# Patient Record
Sex: FEMALE | Race: BLACK OR AFRICAN AMERICAN | URBAN - METROPOLITAN AREA
[De-identification: names, ages, dates, MRNs, and addresses within clinical notes are randomized per-mention and may not be internally consistent; named-entity substitution may affect disease eponyms.]

---

## 2017-01-18 ENCOUNTER — GENERIC CONVERSION - ENCOUNTER (OUTPATIENT)
Dept: OTHER | Facility: OTHER | Age: 47
End: 2017-01-18

## 2017-03-28 ENCOUNTER — APPOINTMENT (OUTPATIENT)
Dept: LAB | Facility: CLINIC | Age: 47
End: 2017-03-28
Payer: COMMERCIAL

## 2017-03-28 ENCOUNTER — TRANSCRIBE ORDERS (OUTPATIENT)
Dept: LAB | Facility: CLINIC | Age: 47
End: 2017-03-28

## 2017-03-28 DIAGNOSIS — N18.30 CHRONIC KIDNEY DISEASE, STAGE III (MODERATE) (HCC): Primary | ICD-10-CM

## 2017-03-28 DIAGNOSIS — I12.9 PARENCHYMAL RENAL HYPERTENSION, STAGE 1-4 OR UNSPECIFIED CHRONIC KIDNEY DISEASE: ICD-10-CM

## 2017-03-28 DIAGNOSIS — E11.21 DIABETIC GLOMERULOPATHY (HCC): ICD-10-CM

## 2017-03-28 DIAGNOSIS — I50.9 HEART FAILURE, UNSPECIFIED (HCC): ICD-10-CM

## 2017-03-28 DIAGNOSIS — D64.9 ANEMIA, UNSPECIFIED: ICD-10-CM

## 2017-03-28 DIAGNOSIS — E66.9 OBESITY, UNSPECIFIED: ICD-10-CM

## 2017-03-28 DIAGNOSIS — I51.7 CARDIOMEGALY: ICD-10-CM

## 2017-03-28 DIAGNOSIS — E87.1 HYPOSMOLALITY SYNDROME: ICD-10-CM

## 2017-03-28 DIAGNOSIS — IMO0002 UNCONTROLLED TYPE 2 DIABETES MELLITUS WITH OTHER SPECIFIED COMPLICATION: ICD-10-CM

## 2017-03-28 DIAGNOSIS — R80.9 PROTEINURIA: ICD-10-CM

## 2017-03-28 DIAGNOSIS — E13.40 DIABETIC NEUROPATHY ASSOCIATED WITH OTHER SPECIFIED DIABETES MELLITUS: ICD-10-CM

## 2017-03-28 DIAGNOSIS — E78.5 HYPERLIPIDEMIA, UNSPECIFIED HYPERLIPIDEMIA TYPE: ICD-10-CM

## 2017-03-28 DIAGNOSIS — N18.30 CHRONIC KIDNEY DISEASE, STAGE III (MODERATE) (HCC): ICD-10-CM

## 2017-03-28 LAB
ALBUMIN SERPL BCP-MCNC: 1.9 G/DL (ref 3.5–5)
ANION GAP SERPL CALCULATED.3IONS-SCNC: 8 MMOL/L (ref 4–13)
BASOPHILS # BLD AUTO: 0.03 THOUSANDS/ΜL (ref 0–0.1)
BASOPHILS NFR BLD AUTO: 0 % (ref 0–1)
BUN SERPL-MCNC: 80 MG/DL (ref 5–25)
CALCIUM SERPL-MCNC: 8.6 MG/DL (ref 8.3–10.1)
CHLORIDE SERPL-SCNC: 100 MMOL/L (ref 100–108)
CO2 SERPL-SCNC: 28 MMOL/L (ref 21–32)
CREAT SERPL-MCNC: 4.33 MG/DL (ref 0.6–1.3)
EOSINOPHIL # BLD AUTO: 0.17 THOUSAND/ΜL (ref 0–0.61)
EOSINOPHIL NFR BLD AUTO: 2 % (ref 0–6)
ERYTHROCYTE [DISTWIDTH] IN BLOOD BY AUTOMATED COUNT: 13.7 % (ref 11.6–15.1)
GFR SERPL CREATININE-BSD FRML MDRD: 13.4 ML/MIN/1.73SQ M
GLUCOSE SERPL-MCNC: 170 MG/DL (ref 65–140)
HCT VFR BLD AUTO: 25.9 % (ref 34.8–46.1)
HGB BLD-MCNC: 8.4 G/DL (ref 11.5–15.4)
LYMPHOCYTES # BLD AUTO: 2.7 THOUSANDS/ΜL (ref 0.6–4.47)
LYMPHOCYTES NFR BLD AUTO: 29 % (ref 14–44)
MCH RBC QN AUTO: 28.8 PG (ref 26.8–34.3)
MCHC RBC AUTO-ENTMCNC: 32.4 G/DL (ref 31.4–37.4)
MCV RBC AUTO: 89 FL (ref 82–98)
MONOCYTES # BLD AUTO: 0.56 THOUSAND/ΜL (ref 0.17–1.22)
MONOCYTES NFR BLD AUTO: 6 % (ref 4–12)
NEUTROPHILS # BLD AUTO: 5.91 THOUSANDS/ΜL (ref 1.85–7.62)
NEUTS SEG NFR BLD AUTO: 63 % (ref 43–75)
NRBC BLD AUTO-RTO: 0 /100 WBCS
PHOSPHATE SERPL-MCNC: 3.9 MG/DL (ref 2.7–4.5)
PLATELET # BLD AUTO: 408 THOUSANDS/UL (ref 149–390)
PMV BLD AUTO: 11.2 FL (ref 8.9–12.7)
POTASSIUM SERPL-SCNC: 3.8 MMOL/L (ref 3.5–5.3)
RBC # BLD AUTO: 2.92 MILLION/UL (ref 3.81–5.12)
SODIUM SERPL-SCNC: 136 MMOL/L (ref 136–145)
WBC # BLD AUTO: 9.46 THOUSAND/UL (ref 4.31–10.16)

## 2017-03-28 PROCEDURE — 85025 COMPLETE CBC W/AUTO DIFF WBC: CPT

## 2017-03-28 PROCEDURE — 80069 RENAL FUNCTION PANEL: CPT

## 2017-03-28 PROCEDURE — 36415 COLL VENOUS BLD VENIPUNCTURE: CPT

## 2017-03-29 ENCOUNTER — GENERIC CONVERSION - ENCOUNTER (OUTPATIENT)
Dept: OTHER | Facility: OTHER | Age: 47
End: 2017-03-29

## 2017-05-17 ENCOUNTER — ALLSCRIPTS OFFICE VISIT (OUTPATIENT)
Dept: OTHER | Facility: OTHER | Age: 47
End: 2017-05-17

## 2017-05-17 DIAGNOSIS — Z12.31 ENCOUNTER FOR SCREENING MAMMOGRAM FOR MALIGNANT NEOPLASM OF BREAST: ICD-10-CM

## 2017-06-06 ENCOUNTER — GENERIC CONVERSION - ENCOUNTER (OUTPATIENT)
Dept: OTHER | Facility: OTHER | Age: 47
End: 2017-06-06

## 2017-06-08 ENCOUNTER — ALLSCRIPTS OFFICE VISIT (OUTPATIENT)
Dept: OTHER | Facility: OTHER | Age: 47
End: 2017-06-08

## 2017-06-08 ENCOUNTER — GENERIC CONVERSION - ENCOUNTER (OUTPATIENT)
Dept: OTHER | Facility: OTHER | Age: 47
End: 2017-06-08

## 2017-06-09 ENCOUNTER — GENERIC CONVERSION - ENCOUNTER (OUTPATIENT)
Dept: OTHER | Facility: OTHER | Age: 47
End: 2017-06-09

## 2017-06-10 ENCOUNTER — GENERIC CONVERSION - ENCOUNTER (OUTPATIENT)
Dept: OTHER | Facility: OTHER | Age: 47
End: 2017-06-10

## 2017-06-15 ENCOUNTER — GENERIC CONVERSION - ENCOUNTER (OUTPATIENT)
Dept: OTHER | Facility: OTHER | Age: 47
End: 2017-06-15

## 2017-06-22 ENCOUNTER — ALLSCRIPTS OFFICE VISIT (OUTPATIENT)
Dept: OTHER | Facility: OTHER | Age: 47
End: 2017-06-22

## 2017-06-27 ENCOUNTER — GENERIC CONVERSION - ENCOUNTER (OUTPATIENT)
Dept: OTHER | Facility: OTHER | Age: 47
End: 2017-06-27

## 2018-01-11 NOTE — MISCELLANEOUS
Provider Comments  Provider Comments:   Patient is a no-show for her 1:00 appointment today        Signatures   Electronically signed by : ANASTACIA Molina; Nov  3 2016  1:21PM EST                       (Author)

## 2018-01-13 NOTE — MISCELLANEOUS
Assessment    1  Hypertensive crisis (401 9) (I16 9)   2  Chronic kidney disease, stage 3 (585 3) (N18 3)   3  Ischemic heart disease (414 9) (I25 9)   4  Hypertension (401 9) (I10)   5  Hyperphosphatemia (275 3) (E83 39)   6  Obstructive sleep apnea (327 23) (G47 33)   7  Morbid obesity (278 01) (E66 01)   8  Anemia of chronic disease (285 29) (D63 8)   9  Uncontrolled secondary diabetes with peripheral neuropathy (249 61,357 2)   (E13 42,E13 65)   10  Congestive heart failure (428 0) (I50 9)    Plan  Anemia of chronic disease    · Ferrous Sulfate 325 (65 Fe) MG Oral Tablet; take 1 tablet by mouth daily   Rx By: Leslie Hatchet; Dispense: 90 Days ; #:1 X 90 Tablet Bottle; Refill: 1; For: Anemia of chronic disease; CARMELA = N; Record   · Vitamin C 500 MG Oral Tablet; TAKE 1 TABLET DAILY   Rx By: Leslie Hatchet; Dispense: 90 Days ; #:90 Tablet; Refill: 1; For: Anemia of chronic disease; CARMELA = N; Verified Transmission to [address] [place] [address],  [address]; Last Updated By: System, [employer]; 5/17/2017 10:23:00 AM  Chronic kidney disease, stage 3    · 2 - Jake MENDEZ, [facility]  (Nephrology) Co-Management  *  Status: Active  Requested  for: 83XQY7110   Ordered; For: Chronic kidney disease, stage 3; Ordered By: Leslie Hatchet Performed:  Due: 92QSH5011; Last Updated By: Mary Britton; 5/17/2017 10:11:30 AM  Care Summary provided  : Yes  Congestive heart failure    · Bumetanide 1 MG Oral Tablet; Take 2 tablets orally twice daily   Rx By: Leslie Hatchet; Dispense: 30 Days ; #:120 Tablet; Refill: 1; For: Congestive heart failure; CARMELA = N; Verified Transmission to [address] [place] [address],  [address]; Last Updated By: System, SureScript[employer]; 5/17/2017 10:23:00 AM  Diabetic eye exam    · *VB - Eye Exam; Status:Active; Requested for:04Lns5504;    Perform:Other; IWV:37DXM6212;BMURUIS; For:Diabetic eye exam; Ordered By:Kadie Foster; Encounter for diabetic foot exam    · *VB - Foot Exam; Status:Active; Requested for:40Bzl8778;    Perform: In Office; HPI     Last [facility] exam 02/02/2016  Macular degeneration (senile) of retina  Pseudophakia, OU  Screening for glaucoma  RE  Decreased near point       Last edited by Niranjan Bro MA on 2/6/2017 10:14 AM.         Assessment /Plan     For exam results, see Encounter Report.    Macular degeneration (senile) of retina    Pseudophakia of both eyes    Glaucoma screening    Refractive error      ARMD mild OU = continue OCUVITE.  Stable PIOL OU.  OH OK OU otherwise.  Spec Rx given.  RTC one year.                  OPH:63NWC3904;GENO Mancera for diabetic foot exam; Ordered By:Adeola Foster; Encounter for screening mammogram for breast cancer    · * MAMMO SCREENING BILATERAL W CAD; Status:Active; Requested for:93Kjb8774;    Perform:[facility]; ILB:47NRA5734; Last Updated Jacky Mccrary; 5/17/2017 10:12:15 AM;Ordered;  For:Encounter for screening mammogram for breast cancer; Ordered By:Adeola Foster; Hyperlipidemia    · Ezetimibe 10 MG Oral Tablet (Zetia); TAKE 1 TABLET AT BEDTIME   Rx By: Rachel Ortiz; Dispense: 90 Days ; #:90 Tablet; Refill: 1; For: Hyperlipidemia; CARMELA = N; Record  Hypertension    · CloNIDine HCl - 0 2 MG Oral Tablet; TAKE 2 TABLETS EVERY 8 HOURS   Rx By: Rachel Ortiz; Dispense: 30 Days ; #:180 Tablet; Refill: 2; For: Hypertension; CARMELA = N; Record   · Losartan Potassium 50 MG Oral Tablet; TAKE 1 TABLET DAILY AS DIRECTED   Rx By: Rachel Ortiz; Dispense: 90 Days ; #:90 Tablet; Refill: 1; For: Hypertension; CARMELA = N; Record   · NIFEdipine ER Osmotic Release 90 MG Oral Tablet Extended Release 24 Hour;  take one tablet by mouth daily   Rx By: Rachel Ortiz; Dispense: 90 Days ; #:90 Tablet Extended Release 24 Hour; Refill: 1; For: Hypertension; CARMELA = N; Record  Hypertensive crisis, Ischemic heart disease    · 3  Anatoliy PATEL [facility] (CARDIOLOGY ) Co-Management  *  Status: Active  Requested  for: 66INV8776   Ordered; For: Hypertensive crisis, Ischemic heart disease; Ordered By: Rachel Ortiz Performed:  Due: 42YSH2041; Last Updated By: Forest Sanderson; 5/17/2017 10:11:41 AM  are Referring to a non- Preferred Provider : Established Patient  Care Summary provided  : Yes  Pap smear for cervical cancer screening    · Britt Emerson MD, [facility]  (Internal Medicine) Co-Management  *  Status: Active  Requested  for: 65FBI7854   Ordered;  For: Pap smear for cervical cancer screening; Ordered By: Rachel Ortiz Performed:  Due: 15JDU8684; Last Updated By: Forest Sanderson; 5/17/2017 10:12:04 AM  are Referring to a non- Preferred Provider : Patient refused suggestion for      recommended provider  Care Summary provided  : Yes  Uncontrolled secondary diabetes with peripheral neuropathy    · From  Lantus 100 UNIT/ML Subcutaneous Solution 45 units at bedtime To  Lantus 100 UNIT/ML Subcutaneous Solution 15 units at bedtime   Rx By: Cynthia St; Dispense: 0 Days ; #:1 ML; Refill: 0; For: Uncontrolled secondary diabetes with peripheral neuropathy; CARMELA = N; Record   · 3 - Abhishek MENDEZ, Dom[facility] Head  (Endocrinology) Co-Management  *  Status: Active  Requested for:  53HBV5642   Ordered; For: Uncontrolled secondary diabetes with peripheral neuropathy; Ordered By: Cynthia St Performed:  Due: 52QFR2884; Last Updated By: Tobias Morrison; 5/17/2017 10:11:52 AM  are Referring to a non- Preferred Provider : Services not provided in network  Care Summary provided  : Yes  Vitamin D deficiency    · Vitamin D (Ergocalciferol) 47594 UNIT Oral Capsule; TAKE 1 CAPSULE WEEKLY   Rx By: Cynthia St; Dispense: 30 Days ; #:4 Capsule; Refill: 5; For: Vitamin D deficiency; CARMELA = N; Record    Discussion/Summary  Discussion Summary:   Patient states that she does not want to go back to Dr Maria T Lanza for her diabetes since she's currently practicing in [place] [address] which is quite a distance for her  She prefers a provider in [facility]  I did give her the phone number for Dr Eldridge Grandchild group  She has been advised to start checking her blood sugars at home  Continue on Lantus 15 units at bedtime  Continue current blood pressure medications including labetalol, nifedipine, losartan as directed  She has been advised to sign a release form to get records from Dr Hector Wolf, cardiology  Also recommend sign a release form to get most recent blood work done at [facility] during her admission  Follow-up with nephrology as scheduled on May 31      5/18/17--Received labs from [facility]  LDL is 154  Vitamin D level is 12 8   There was no hemoglobin A1c which will be done at her next visit  Counseling Documentation With Imm: The patient was counseled regarding instructions for management, impressions  Medication SE Review and Pt Understands Tx: The treatment plan was reviewed with the patient/guardian  The patient/guardian understands and agrees with the treatment plan   Self Referrals:   Self Referrals: No      Chief Complaint  Chief Complaint Free Text Note Form: Pt here today to f/u from [facility] admitted on 4/30/17 and discharged on 5/10/17  Pt states she was admitted for HTN and severe swelling all through her body  and CHF      History of Present Illness  TCM Communication [facility]: The patient is being contacted for follow-up after hospitalization and APPT 5/17/17 WITH MOI Tatum  She was hospitalized at [facility]  The date of discharge: 5/10/17, HTN,CHRONIC KIDNEY FAILURE  She was discharged to home  Communication performed and completed by Janell Hopper   HPI: Patient presents today for follow-up from a recent hospital admission at [facility] from April 30 through May 10  She was admitted for hypertensive crisis and chronic kidney disease  She did see cardiology yesterday, Dr Laura Irby, with [facility]  She is scheduled to see nephrology on May 31  Denies any chest pain or shortness of breath at this time  Only very mild lower extremity edema  Not all of her medications were sent to the pharmacy  She does need bumetanide 1mg 2 tablets twice daily  She also needs vitamin C one tablet daily  She has been compliant with her blood pressure medications  She's also been compliant with her C Pap machine nightly  She does take her iron supplement daily  She's also on labetalol 100 mg 2 tablets once daily, losartan 50 mg 2 tablets once daily, nifedipine 90 mg one tablet daily  Clonidine 0 1 mg 2 tablets every 8 hours  Calcium acetate 667 mg one capsule 3 times daily  Atorvastatin 80 mg daily  Zetia 10 mg daily      She does not check her blood sugars regularly  She is currently on Lantus 15 units at bedtime  She states that her daughter put her glucometer away and she's not sure where it is  She has not seen endocrinology since last year  Review of Systems  Complete-Female:   Constitutional: No fever, no chills, feels well, no tiredness, no recent weight gain or weight loss  Cardiovascular: as noted in HPI  Respiratory: as noted in HPI  Active Problems    1  Abnormal electrocardiogram (794 31) (R94 31)   2  Carpal tunnel syndrome, bilateral (354 0) (G56 03)   3  Cervical cancer screening (V76 2) (Z12 4)   4  Chest pain (786 50) (R07 9)   5  Chronic kidney disease, stage 3 (585 3) (N18 3)   6  Congestive heart failure (428 0) (I50 9)   7  Edema of both legs (782 3) (R60 0)   8  Encounter for screening mammogram for breast cancer (V76 12) (Z12 31)   9  Fatigue (780 79) (R53 83)   10  Hyperlipidemia (272 4) (E78 5)   11  Hypertension (401 9) (I10)   12  Influenza vaccine needed (V04 81) (Z23)   13  Left shoulder pain (719 41) (M25 512)   14  Nephrotic range proteinuria (791 0) (R80 9)   15  Noncompliance of patient with other medical treatment and regimen (V15 81) (Z91 19)   16  Obstructive sleep apnea (327 23) (G47 33)   17  Pap smear for cervical cancer screening (V76 2) (Z12 4)   18  Restless legs syndrome (333 94) (G25 81)   19  Shortness of breath (786 05) (R06 02)   20  Type 2 diabetes mellitus with hyperglycemia (250 00) (E11 65)   21  Uncontrolled secondary diabetes with peripheral neuropathy (249 61,357 2)    (E13 42,E13 65)   22  Upper back pain (724 5) (M54 9)   23  Wound of right lower extremity (891 0) (S81 801A)    Surgical History    1  History of  Section    Family History  Mother    1  Family history of Breast Cancer (V16 3)  Sister    2  Family history of Diabetes Mellitus (V18 0)   3   Family history of Diabetes Mellitus (V18 0)    Social History    · Being A Social Drinker   · Denied: History of Drug Use   · Never A Smoker    Current Meds   1  Aspirin 81 MG TABS; Therapy: (Recorded:81Khj0096) to Recorded   2  Atorvastatin Calcium 80 MG Oral Tablet; Therapy: 52PCU6341 to Recorded   3  BD Pen Needle Shanice U/F 32G X 4 MM Miscellaneous; Therapy: 99Qqq7555 to Recorded   4  Blood Pressure Monitor/Arm Device; Take blood pressure twice daily; Therapy: 71GBO4880 to (Last Rx:08Qop7558) Ordered   5  Labetalol HCl - 200 MG Oral Tablet; Therapy: (Recorded:23Nov2015) to Recorded   6  Lantus 100 UNIT/ML Subcutaneous Solution; 45 units at bedtime; Therapy: (Recorded:87Jcs8486) to Recorded   7  Vitamin D (Ergocalciferol) 63416 UNIT Oral Capsule; Therapy: 79YUR0346 to Recorded    Allergies    1  No Known Drug Allergies    Vitals  Signs   Recorded: 24YNO3953 09:40AM   Temperature: 95 7 F, Tympanic  Heart Rate: 61  Respiration: 18  Systolic: 358, RUE, Sitting  Diastolic: 60, RUE, Sitting  Height: 5 ft 1 5 in  Weight: 211 lb 5 oz  BMI Calculated: 39 28  BSA Calculated: 1 95  O2 Saturation: 93    Physical Exam    Constitutional   General appearance: Abnormal   well developed, appears healthy, well nourished, obese and well hydrated  Ears, Nose, Mouth, and Throat   External inspection of ears and nose: Normal     Otoscopic examination: Tympanic membranes translucent with normal light reflex  Canals patent without erythema  Oropharynx: Normal with no erythema, edema, exudate or lesions  Pulmonary   Respiratory effort: No increased work of breathing or signs of respiratory distress  Auscultation of lungs: Clear to auscultation  Cardiovascular   Auscultation of heart: Normal rate and rhythm, normal S1 and S2, without murmurs  Examination of extremities for edema and/or varicosities: Abnormal   Very mild edema both lower extremities          Signatures   Electronically signed by : Teresa Olsen, [facility]; May 18 2017  2:44PM EST                       (Author)    Electronically signed by : TERRY To ; May 18 2017  3:01PM EST                       (Author)

## 2018-01-14 VITALS
DIASTOLIC BLOOD PRESSURE: 60 MMHG | RESPIRATION RATE: 18 BRPM | OXYGEN SATURATION: 93 % | TEMPERATURE: 95.7 F | SYSTOLIC BLOOD PRESSURE: 140 MMHG | BODY MASS INDEX: 38.89 KG/M2 | WEIGHT: 211.31 LBS | HEART RATE: 61 BPM | HEIGHT: 62 IN

## 2018-01-15 NOTE — MISCELLANEOUS
Assessment    1  Shortness of breath (786 05) (R06 02)   2  Congestive heart failure (428 0) (I50 9)   3  Chronic kidney disease, stage 3 (585 3) (N18 3)   4  Type 2 diabetes mellitus with hyperglycemia (250 00) (E11 65)    Plan  Hypertension    · From  CloNIDine HCl - 0 2 MG Oral Tablet TAKE 2 TABLETS EVERY 8 HOURS  To CloNIDine HCl - 0 2 MG Oral Tablet TAKE 1 TABLET 3 TIMES DAILY   Rx By: Brittni Tan; Dispense: 30 Days ; #:90 Tablet; Refill: 0; For: Hypertension; CARMELA = N; Record    Discussion/Summary  Discussion Summary:   BP was elevated today  Recommend to increase clonidine to three times a day and would recommend to discuss with nephrologist about this change  Make sure to make follow up with the specialist    Updated medication list from the hospital   Recommend making follow up appointment with Ajay Jhaveri in 1 month, or return to the office with any concerns  Medication SE Review and Pt Understands Tx: Possible side effects of new medications were reviewed with the patient/guardian today  The treatment plan was reviewed with the patient/guardian  The patient/guardian understands and agrees with the treatment plan   Self Referrals:   Self Referrals: No      Chief Complaint  Chief Complaint Free Text Note Form: [facility] follow up for renal disease  History of Present Illness  TCM Communication [facility]: The patient is being contacted for follow-up after hospitalization  She was hospitalized at [place] [address]  The date of admission: 06/09/17, date of discharge: 06/13/17  Diagnosis: SOB,cough  She was discharged to home  Medications were not reviewed today  She scheduled a follow up appointment  Follow-up appointments with other specialists: PCP 06/22/17 at 10 am  The patient is currently asymptomatic  Counseling was provided to the patient   pt is advised t bring discharge papers and updated med list    Communication performed and completed by Clara Sargent   HPI: 39 y/o F presenting follow up of recent hospital admission at [facility] for SOB  Was admitted from 6/10-6/13  She was at dialysis and was having SOB  Went to ED and was found to have SBP >200  Patient states she does not know why she had the SOB  Discharge noted states that CXR showed pulmonary edema  Was also noted to have an EF of 45%, otherwise had a normal cardiac work up  Patient states that she is feeling fine today  Denies any chest pain, palpitations, SOB  Patient has a follow up appointment with cardiology in 6 weeks  Unsure of when she is suppose to follow up with nephrologist, but goes to dialysis 3 times a week  Has been following up with vascular surgeon for AVF  Has appointment with ophthalmologist on 7/18 for vision changes  Has been checking BS at home, and has been around 150  Review of Systems  Complete-Female:   Constitutional: No fever, no chills, feels well, no tiredness, no recent weight gain or weight loss  Eyes: eyesight problems  Cardiovascular: No complaints of slow heart rate, no fast heart rate, no chest pain, no palpitations, no leg claudication, no lower extremity edema  Respiratory: No complaints of shortness of breath, no wheezing, no cough, no SOB on exertion, no orthopnea, no PND  Gastrointestinal: No complaints of abdominal pain, no constipation, no nausea or vomiting, no diarrhea, no bloody stools  Neurological: No complaints of headache, no confusion, no convulsions, no numbness, no dizziness or fainting, no tingling, no limb weakness, no difficulty walking  Psychiatric: Not suicidal, no sleep disturbance, no anxiety or depression, no change in personality, no emotional problems  ROS Reviewed:   ROS reviewed  Active Problems    1  Abdominal pain, left lower quadrant (789 04) (R10 32)   2  Abnormal electrocardiogram (794 31) (R94 31)   3  Anemia of chronic disease (285 29) (D63 8)   4  Carpal tunnel syndrome, bilateral (354 0) (G56 03)   5  Cervical cancer screening (V76 2) (Z12 4)   6  Chest pain (786 50) (R07 9)   7  Chronic kidney disease, stage 3 (585 3) (N18 3)   8  Congestive heart failure (428 0) (I50 9)   9  Diabetic eye exam (V72 0,250 00) (E11 9,Z01 00)   10  Edema of both legs (782 3) (R60 0)   11  Encounter for diabetic foot exam (250 00) (E11 9)   12  Encounter for screening mammogram for breast cancer (V76 12) (Z12 31)   13  Fatigue (780 79) (R53 83)   14  Hyperlipidemia (272 4) (E78 5)   15  Hyperphosphatemia (275 3) (E83 39)   16  Hypertension (401 9) (I10)   17  Hypertensive crisis (401 9) (I16 9)   18  Influenza vaccine needed (V04 81) (Z23)   19  Ischemic heart disease (414 9) (I25 9)   20  Left shoulder pain (719 41) (M25 512)   21  Morbid obesity (278 01) (E66 01)   22  Nausea, vomiting and diarrhea (787 91,787 01) (R11 2,R19 7)   23  Nephrotic range proteinuria (791 0) (R80 9)   24  Noncompliance of patient with other medical treatment and regimen (V15 81) (Z91 19)   25  Obstructive sleep apnea (327 23) (G47 33)   26  Pap smear for cervical cancer screening (V76 2) (Z12 4)   27  Restless legs syndrome (333 94) (G25 81)   28  Shortness of breath (786 05) (R06 02)   29  Type 2 diabetes mellitus with hyperglycemia (250 00) (E11 65)   30  Uncontrolled secondary diabetes with peripheral neuropathy (249 61,357 2)    (E13 42,E13 65)   31  Upper back pain (724 5) (M54 9)   32  Visual impairment in both eyes (369 3) (H54 3)   33  Vitamin D deficiency (268 9) (E55 9)   34  Wound of right lower extremity (891 0) (S81 801A)    Surgical History    1  History of  Section  Surgical History Reviewed: The surgical history was reviewed and updated today  Family History  Mother    1  Family history of Breast Cancer (V16 3)  Sister    2  Family history of Diabetes Mellitus (V18 0)   3  Family history of Diabetes Mellitus (V18 0)  Family History Reviewed: The family history was reviewed and updated today         Social History    · Being A Social Drinker   · Denied: History of Drug Use   · Never A Smoker  Social History Reviewed: The social history was reviewed and updated today  The social history was reviewed and is unchanged  Current Meds   1  Aspirin 81 MG TABS; Therapy: (Recorded:44Djh3127) to Recorded   2  Atorvastatin Calcium 80 MG Oral Tablet; Therapy: 66AQF4663 to Recorded   3  BD Pen Needle Shanice U/F 32G X 4 MM Miscellaneous; Therapy: 72Uow5702 to Recorded   4  Blood Pressure Monitor/Arm Device; Take blood pressure twice daily; Therapy: 35FNF6591 to (Last Rx:25Feb2014) Ordered   5  Candesartan Cilexetil 32 MG Oral Tablet; TAKE 1 TABLET DAILY; Therapy: 62ATU6648 to Recorded   6  CloNIDine HCl - 0 2 MG Oral Tablet; TAKE 2 TABLETS EVERY 8 HOURS; Therapy: 53YCJ8821 to (Evaluate:16Aug2017); Last Rx:18May2017 Ordered   7  Ezetimibe 10 MG Oral Tablet; TAKE 1 TABLET AT BEDTIME; Therapy: 18BYK3140 to (Evaluate:14Nov2017); Last Rx:18May2017 Ordered   8  Ferrous Sulfate 325 (65 Fe) MG Oral Tablet; take 1 tablet by mouth daily; Therapy: 64BTY4158 to (Evaluate:14Nov2017); Last Rx:18May2017 Ordered   9  HumaLOG KwikPen 100 UNIT/ML Subcutaneous Solution Pen-injector; Inject 5 units   twice daily with breakfast and lunch, 6 units with dinner; Therapy: 06OJI9178 to (Evaluate:42Rdu2951); Last Rx:08Jun2017 Ordered   10  Labetalol HCl - 200 MG Oral Tablet; TAKE 4 TABLET 3 times daily; Therapy: (Recorded:23Nov2015) to Recorded   11  Lantus 100 UNIT/ML Subcutaneous Solution; 16 units at bedtime; Last Rx:08Jun2017    Ordered   12  NIFEdipine ER Osmotic Release 60 MG Oral Tablet Extended Release 24 Hour; TAKE 1    TABLET DAILY; Therapy: 01EXY9512 to Recorded   13  Vitamin C 500 MG Oral Tablet; TAKE 1 TABLET DAILY; Therapy: 72AXD6232 to (Evaluate:13Nov2017)  Requested for: 64JJZ5113; Last    Rx:17May2017 Ordered   14  Vitamin D (Ergocalciferol) 06165 UNIT Oral Capsule; TAKE 1 CAPSULE WEEKLY; Therapy: 29TIS9008 to (Evaluate:14Nov2017); Last Rx:38Emx7541 Ordered   15  Vitamin D (Ergocalciferol) 30941 UNIT Oral Capsule; Therapy: 23ITC0198 to Recorded  Medication List Reviewed: The medication list was reviewed and updated today  Allergies    1  No Known Drug Allergies    Vitals  Signs   Recorded: 25XOF6117 09:44AM   Temperature: 96 6 F  Heart Rate: 64  Respiration: 18  Systolic: 267  Diastolic: 60  Height: 5 ft 1 5 in  Weight: 198 lb 7 oz  BMI Calculated: 36 89  BSA Calculated: 1 89  O2 Saturation: 95    Physical Exam    Constitutional   General appearance: No acute distress, well appearing and well nourished  Pulmonary   Respiratory effort: No increased work of breathing or signs of respiratory distress  Auscultation of lungs: Clear to auscultation  Cardiovascular   Palpation of heart: Normal PMI, no thrills  Auscultation of heart: Normal rate and rhythm, normal S1 and S2, without murmurs  Examination of extremities for edema and/or varicosities: Normal     Carotid pulses: Normal     Abdomen   Abdomen: Non-tender, no masses  The abdomen was obese  Liver and spleen: No hepatomegaly or splenomegaly  Musculoskeletal   Gait and station: Normal     Inspection/palpation of joints, bones, and muscles: Normal     Neurologic   Cranial nerves: Cranial nerves 2-12 intact  Reflexes: 2+ and symmetric           Message   Recorded as Task   Date: 06/16/2017 11:12 AM, Created By: Marylu Kelly   Task Name: Follow Up   Assigned To: SONAL BOWMANCHRISTIAN,CLINICAL TEAM   Regarding Patient: Riri Strickland, Status: Active   Comment:    Marylu Kelly - 16 Jun 2017 11:12 AM     TASK CREATED  [facility]  D/C DATE---TUESDAY 6/14/17  [phone]     Signatures   Electronically signed by : Angela Locke, ; Jun 16 2017 12:05PM EST                       (Author)    Electronically signed by : STEFANIA Landa; Jun 22 2017 11:51AM EST                       (Author)    Electronically signed by : TERRY Roland ; Jul  3 2017  1:33PM EST

## 2018-01-18 NOTE — MISCELLANEOUS
Provider Comments  Provider Comments:   Patient is a no-show for her [phone] appointment today        Signatures   Electronically signed by : Virgil Olivas PAC; Dec 15 2016 11:00AM EST                       (Author)

## 2018-01-22 VITALS
SYSTOLIC BLOOD PRESSURE: 152 MMHG | OXYGEN SATURATION: 95 % | DIASTOLIC BLOOD PRESSURE: 60 MMHG | RESPIRATION RATE: 18 BRPM | WEIGHT: 198.44 LBS | HEART RATE: 64 BPM | BODY MASS INDEX: 36.52 KG/M2 | HEIGHT: 62 IN | TEMPERATURE: 96.6 F

## 2018-01-22 VITALS
SYSTOLIC BLOOD PRESSURE: 140 MMHG | RESPIRATION RATE: 18 BRPM | HEART RATE: 75 BPM | HEIGHT: 62 IN | DIASTOLIC BLOOD PRESSURE: 66 MMHG | OXYGEN SATURATION: 92 % | BODY MASS INDEX: 38.38 KG/M2 | WEIGHT: 208.56 LBS | TEMPERATURE: 98.1 F

## 2018-01-23 NOTE — MISCELLANEOUS
Assessment   1  Abdominal pain, left lower quadrant (789 04) (R10 32)1   2  Nausea, vomiting and diarrhea (787 91,787 01) (R11 2,R19 7)1   3  Chronic kidney disease, stage 3 (585 3) (N18 3)1   4  Visual impairment in both eyes (369 3) (H54 3)1   5  Type 2 diabetes mellitus with hyperglycemia (250 00) (E11 65)1      1 Amended By: Rachel Ortiz; Jun 08 2017 5:49 PM EST    Discussion/Summary  Discussion Summary:   Patient will continue follow-up with nephrology and dialysis as scheduled  Continue follow-up with endocrinology as scheduled  She will follow-up with her ophthalmologist for vision which is currently 20/200 bilaterally  Follow-up in 6 weeks, sooner if needed  1    Counseling Documentation With Imm: The  patient1  was counseled regarding1  instructions for management1 , impressions1   Medication SE Review and Pt Understands Tx: The treatment plan was reviewed with the patient/guardian  The patient/guardian understands and agrees with the treatment plan1    Self Referrals:   Self Referrals: No1        1 Amended By: Rachel Ortiz; Jun 08 2017 5:50 PM EST    Chief Complaint  Chief Complaint Free Text Note Form: pt here today to f/u from [facility] Vomiting, and 1  1,2  diarrhea  pt states she has been feeling "okay"2        1 Amended By: Emili Alberto; Jun 08 2017 5:24 PM EST   2 Amended By: Emili Alberto; Jun 08 2017 5:24 PM EST    History of Present Illness  TCM Communication [facility]: The patient is being contacted for follow-up after hospitalization and APPT 6/8/17 AT [address] [place] [address],[address]  She was hospitalized at [facility] and UNSURE OF DATES  The WENT IN FOR VOMITING AND LEFT WITH A CATH IN FOR DIALYSIS  She was discharged to home  Follow-up appointments with other specialists: Pernell STEWART,W,F  Communication performed and completed by Obi Mak   HPI: Patient presents today for follow-up from a recent hospital admission at [facility] from May 22 through June 1   She was admitted for left lower quadrant pain of the abdomen but also had associated vomiting and diarrhea  Unfortunately she did go into kidney failure and is now on dialysis 3 times a week  Denies any abdominal pain, nausea, vomiting or diarrhea at this time  She states and she left the hospital her vision seems " dark"  Denies any visual loss  She does need updated glasses  She states that her ophthalmologist does not have an opening until the end of July but advised her to call on a Monday or Friday to fit her in with a cancellation  1        1 Amended By: Dennice Saint; Jun 08 2017 5:42 PM EST    Review of Systems  Complete-Female:   Constitutional:1  No fever, no chills, feels well, no tiredness, no recent weight gain or weight loss1   Eyes:1  as noted in [address]   Gastrointestinal:1  as noted in [address]   1 Amended By: Dennice Saint; Jun 08 2017 5:43 PM EST    Active Problems   1  Abnormal electrocardiogram (794 31) (R94 31)  2  Anemia of chronic disease (285 29) (D63 8)  3  Carpal tunnel syndrome, bilateral (354 0) (G56 03)  4  Cervical cancer screening (V76 2) (Z12 4)  5  Chest pain (786 50) (R07 9)  6  Chronic kidney disease, stage 3 (585 3) (N18 3)  7  Congestive heart failure (428 0) (I50 9)  8  Diabetic eye exam (V72 0,250 00) (E11 9,Z01 00)  9  Edema of both legs (782 3) (R60 0)  10  Encounter for diabetic foot exam (250 00) (E11 9)  11  Encounter for screening mammogram for breast cancer (V76 12) (Z12 31)  12  Fatigue (780 79) (R53 83)  13  Hyperlipidemia (272 4) (E78 5)  14  Hyperphosphatemia (275 3) (E83 39)  15  Hypertension (401 9) (I10)  16  Hypertensive crisis (401 9) (I16 9)  17  Influenza vaccine needed (V04 81) (Z23)  18  Ischemic heart disease (414 9) (I25 9)  19  Left shoulder pain (719 41) (M25 512)  20  Morbid obesity (278 01) (E66 01)  21  Nephrotic range proteinuria (791 0) (R80 9)  22  Noncompliance of patient with other medical treatment and regimen (V15 81) (Z91 19)  23   Obstructive sleep apnea (327 23) (G47 33)  24  Pap smear for cervical cancer screening (V76 2) (Z12 4)  25  Restless legs syndrome (333 94) (G25 81)  26  Shortness of breath (786 05) (R06 02)  27  Type 2 diabetes mellitus with hyperglycemia (250 00) (E11 65)  28  Uncontrolled secondary diabetes with peripheral neuropathy (249 61,357 2)    (E13 42,E13 65)  29  Upper back pain (724 5) (M54 9)  30  Vitamin D deficiency (268 9) (E55 9)  31  Wound of right lower extremity (891 0) (S81 801A)    Surgical History   1  History of  Section    Family History  Mother   1  Family history of Breast Cancer (V16 3)  Sister   2  Family history of Diabetes Mellitus (V18 0)  3  Family history of Diabetes Mellitus (V18 0)    Social History    · Being A Social Drinker   · Denied: History of Drug Use   · Never A Smoker    Current Meds  1  Aspirin 81 MG TABS; Therapy: (Recorded:67Zvx7105) to Recorded  2  Atorvastatin Calcium 80 MG Oral Tablet; Therapy: 30COB0411 to Recorded  3  BD Pen Needle Shanice U/F 32G X 4 MM Miscellaneous; Therapy: 84Pdm4023 to Recorded  4  Blood Pressure Monitor/Arm Device; Take blood pressure twice daily; Therapy: 48GKM2129 to (Last Rx:53Har8731) Ordered  5  Bumetanide 1 MG Oral Tablet; Take 2 tablets orally twice daily; Therapy: 89POZ1835 to (Evaluate:46Bzd3715)  Requested for: 08ABS0809; Last   Rx:2017 Ordered  6  CloNIDine HCl - 0 2 MG Oral Tablet; TAKE 2 TABLETS EVERY 8 HOURS; Therapy: 74JVY6859 to (Evaluate:72Npl4437); Last Rx:68Uua2249 Ordered  7  Ezetimibe 10 MG Oral Tablet; TAKE 1 TABLET AT BEDTIME; Therapy: 76WAB9973 to (Evaluate:2017); Last Rx:12Pks0197 Ordered  8  Ferrous Sulfate 325 (65 Fe) MG Oral Tablet; take 1 tablet by mouth daily; Therapy: 12YOP9484 to (Evaluate:2017); Last Rx:02Xdb5207 Ordered  9  Labetalol HCl - 200 MG Oral Tablet; Therapy: (Recorded:91Hxf1052) to Recorded  10  Lantus 100 UNIT/ML Subcutaneous Solution; 15 units at bedtime; Last Rx:40Eao9173    Ordered  11   Losartan Potassium 50 MG Oral Tablet; TAKE 1 TABLET DAILY AS DIRECTED; Therapy: 69FHV0641 to (Evaluate:14Nov2017); Last Rx:15Azt1221 Ordered  12  NIFEdipine ER Osmotic Release 90 MG Oral Tablet Extended Release 24 Hour; take    one tablet by mouth daily; Therapy: 40SXH2908 to (Evaluate:14Nov2017); Last Rx:94Xrh6763 Ordered  13  Vitamin C 500 MG Oral Tablet; TAKE 1 TABLET DAILY; Therapy: 83ZFE9105 to (Evaluate:13Nov2017)  Requested for: 94MED8070; Last    Rx:14Wgb0190 Ordered  14  Vitamin D (Ergocalciferol) 35719 UNIT Oral Capsule; TAKE 1 CAPSULE WEEKLY; Therapy: 73CUO0391 to (Evaluate:14Nov2017); Last Rx:93Brf7905 Ordered  15  Vitamin D (Ergocalciferol) 80300 UNIT Oral Capsule; Therapy: 47WBB7145 to Recorded    Allergies   1  No Known Drug Allergies    Physical Exam    Constitutional1    General appearance: Abnormal  1  Well developed1 , appears healthy1 , well nourished1 , obese1  and well hydrated1   Ears, Nose, Mouth, and Throat1    External inspection of ears and nose: Normal 1    Otoscopic examination: Tympanic membranes translucent with normal light reflex  Canals patent without erythema  1    Oropharynx: Normal with no erythema, edema, exudate or lesions  1    Pulmonary1    Respiratory effort: No increased work of breathing or signs of respiratory distress  1    Auscultation of lungs: Clear to auscultation  1    Cardiovascular1    Auscultation of heart: Normal rate and rhythm, normal S1 and S2, without murmurs  1    Abdomen1    Abdomen: Non-tender, no masses  1          1 Amended By: Purnima Gillis; Jun 08 2017 5:44 PM EST    Future Appointments    Date/Time Provider Specialty Site   06/08/2017 06:00 PM Talia Foster, [facility]     Signatures   Electronically signed by : Jagdeep De La Cruz, [facility]; Jun 5 2017 12:34PM EST                       (Author)    Electronically signed by : TERRY Roberts ; Jun 5 2017  1:12PM EST                          Electronically signed by : Ofelia Torres, [facility]; Jun 8 2017  5:50PM EST                       (Author)    Electronically signed by : TERRY Silveira ; Jun 9 2017 11:56AM EST